# Patient Record
Sex: FEMALE | Race: BLACK OR AFRICAN AMERICAN | NOT HISPANIC OR LATINO | Employment: FULL TIME | URBAN - METROPOLITAN AREA
[De-identification: names, ages, dates, MRNs, and addresses within clinical notes are randomized per-mention and may not be internally consistent; named-entity substitution may affect disease eponyms.]

---

## 2018-03-21 ENCOUNTER — OFFICE VISIT (OUTPATIENT)
Dept: OBSTETRICS AND GYNECOLOGY | Facility: CLINIC | Age: 23
End: 2018-03-21
Payer: MEDICAID

## 2018-03-21 VITALS
DIASTOLIC BLOOD PRESSURE: 84 MMHG | HEIGHT: 64 IN | WEIGHT: 213 LBS | SYSTOLIC BLOOD PRESSURE: 136 MMHG | BODY MASS INDEX: 36.37 KG/M2

## 2018-03-21 DIAGNOSIS — Z12.4 SCREENING FOR CERVICAL CANCER: Primary | ICD-10-CM

## 2018-03-21 DIAGNOSIS — Z30.46 ENCOUNTER FOR SURVEILLANCE OF IMPLANTABLE SUBDERMAL CONTRACEPTIVE: ICD-10-CM

## 2018-03-21 DIAGNOSIS — N89.8 VAGINAL DISCHARGE: ICD-10-CM

## 2018-03-21 DIAGNOSIS — Z01.419 ENCOUNTER FOR GYNECOLOGICAL EXAMINATION (GENERAL) (ROUTINE) WITHOUT ABNORMAL FINDINGS: ICD-10-CM

## 2018-03-21 PROCEDURE — 99213 OFFICE O/P EST LOW 20 MIN: CPT | Mod: PBBFAC | Performed by: OBSTETRICS & GYNECOLOGY

## 2018-03-21 PROCEDURE — 87491 CHLMYD TRACH DNA AMP PROBE: CPT

## 2018-03-21 PROCEDURE — 88175 CYTOPATH C/V AUTO FLUID REDO: CPT

## 2018-03-21 PROCEDURE — 99395 PREV VISIT EST AGE 18-39: CPT | Mod: S$PBB,,, | Performed by: OBSTETRICS & GYNECOLOGY

## 2018-03-21 PROCEDURE — 99999 PR PBB SHADOW E&M-EST. PATIENT-LVL III: CPT | Mod: PBBFAC,,, | Performed by: OBSTETRICS & GYNECOLOGY

## 2018-03-21 PROCEDURE — 87480 CANDIDA DNA DIR PROBE: CPT

## 2018-03-22 ENCOUNTER — PATIENT MESSAGE (OUTPATIENT)
Dept: OBSTETRICS AND GYNECOLOGY | Facility: CLINIC | Age: 23
End: 2018-03-22

## 2018-03-22 LAB
C TRACH DNA SPEC QL NAA+PROBE: NOT DETECTED
CANDIDA RRNA VAG QL PROBE: NEGATIVE
G VAGINALIS RRNA GENITAL QL PROBE: NEGATIVE
N GONORRHOEA DNA SPEC QL NAA+PROBE: NOT DETECTED
T VAGINALIS RRNA GENITAL QL PROBE: NEGATIVE

## 2018-03-22 NOTE — PROGRESS NOTES
Subjective:       Patient ID: Judith Chou is a 23 y.o. female.    Chief Complaint:  Gynecologic Exam      History of Present Illness  HPI  Annual Exam-Premenopausal  Patient presents for annual exam. The patient c/o vaginal discharge and irreg bleeding. The patient is sexually active--nexplanon and condom use. GYN screening history: last pap: approximate date  and was abnormal: lsil. The patient wears seatbelts: yes. The patient participates in regular exercise: yes. Has the patient ever been transfused or tattooed?: no. The patient reports that there is not domestic violence in her life.      Menses irregular since placement of nexplanon 2018    Menses vary from spotting to heavier flow, lasting 3-4 days; min dysmenorrhea      GYN & OB History  Patient's last menstrual period was 2018.   Date of Last Pap: 2016    OB History    Para Term  AB Living   0 0 0 0 0 0   SAB TAB Ectopic Multiple Live Births   0 0 0 0               Review of Systems  Review of Systems   Constitutional: Negative for activity change, appetite change, chills, diaphoresis, fatigue, fever and unexpected weight change.   HENT: Negative for mouth sores and tinnitus.    Eyes: Negative for discharge and visual disturbance.   Respiratory: Negative for cough, shortness of breath and wheezing.    Cardiovascular: Negative for chest pain, palpitations and leg swelling.   Gastrointestinal: Negative for abdominal pain, bloating, blood in stool, constipation, diarrhea, nausea and vomiting.   Endocrine: Negative for diabetes, hair loss, hot flashes, hyperthyroidism and hypothyroidism.   Genitourinary: Positive for menstrual problem and vaginal discharge. Negative for decreased libido, dyspareunia, dysuria, flank pain, frequency, genital sores, hematuria, menorrhagia, pelvic pain, urgency, vaginal bleeding, vaginal pain, dysmenorrhea, urinary incontinence, postcoital bleeding, postmenopausal bleeding and  vaginal odor.   Musculoskeletal: Negative for back pain and myalgias.   Skin:  Negative for rash, no acne and hair changes.   Neurological: Negative for seizures, syncope, numbness and headaches.   Hematological: Negative for adenopathy. Does not bruise/bleed easily.   Psychiatric/Behavioral: Negative for depression and sleep disturbance. The patient is not nervous/anxious.    Breast: Negative for breast mass, breast pain, nipple discharge and skin changes          Objective:    Physical Exam:   Constitutional: She appears well-developed.     Eyes: Conjunctivae and EOM are normal. Pupils are equal, round, and reactive to light.    Neck: Normal range of motion. Neck supple.     Pulmonary/Chest: Effort normal. Right breast exhibits no mass, no nipple discharge, no skin change and no tenderness. Left breast exhibits no mass, no nipple discharge, no skin change and no tenderness. Breasts are symmetrical.        Abdominal: Soft.     Genitourinary: Rectum normal and uterus normal. Pelvic exam was performed with patient supine. Cervix is normal. Right adnexum displays no mass and no tenderness. Left adnexum displays no mass and no tenderness. No erythema, bleeding, rectocele, cystocele or unspecified prolapse of vaginal walls in the vagina. Vaginal discharge found. Labial bartholins normal.Additional cervical findings: pap smear done       Uterus Size: 6 cm   Musculoskeletal: Normal range of motion.       Neurological: She is alert.    Skin: Skin is warm.    Psychiatric: She has a normal mood and affect.          Assessment:        1. Screening for cervical cancer    2. Encounter for gynecological examination (general) (routine) without abnormal findings    3. Encounter for surveillance of implantable subdermal contraceptive    4. Vaginal discharge               Plan:      Continue annual well woman exam.  Pap today. Reviewed updated recommendations for pap smears (every 3 years) in low risk patients.   Recommend annual  pelvic exams.  Reviewed recommendations for annual CBE.  Encouraged diet, exercise, weight loss  Reassurance given re irreg menses with nexplanon; if bleeding worsens consider cycle with ocp or nuva ring  Gc/ct/affirm today to eval vaginal discharge

## 2018-04-04 ENCOUNTER — PATIENT MESSAGE (OUTPATIENT)
Dept: OBSTETRICS AND GYNECOLOGY | Facility: CLINIC | Age: 23
End: 2018-04-04

## 2020-12-15 ENCOUNTER — TELEPHONE (OUTPATIENT)
Dept: OBSTETRICS AND GYNECOLOGY | Facility: CLINIC | Age: 25
End: 2020-12-15

## 2020-12-15 NOTE — TELEPHONE ENCOUNTER
----- Message from Marla Perdomo sent at 12/15/2020 12:29 PM CST -----  Patient called in regards to having some questions for Dr. Yoo in regards to delivering her baby. She asked if someone could give her a call back at [phone].

## 2020-12-18 ENCOUNTER — TELEPHONE (OUTPATIENT)
Dept: OBSTETRICS AND GYNECOLOGY | Facility: CLINIC | Age: 25
End: 2020-12-18

## 2020-12-18 NOTE — TELEPHONE ENCOUNTER
Called patient and scheduled annual.  Visitor's policy and check in procedure explained and patient verbalized understanding.

## 2020-12-18 NOTE — TELEPHONE ENCOUNTER
----- Message from Mirella Garcia sent at 12/18/2020  9:03 AM CST -----  Contact: Judith Wong is calling to schedule an annual visit with Dr. Ann Yoo and I didn't see any appointments come up. May you please give her a call back at [phone] to try and schedule.    Thanks  KT

## 2021-03-17 ENCOUNTER — LAB VISIT (OUTPATIENT)
Dept: LAB | Facility: HOSPITAL | Age: 26
End: 2021-03-17
Attending: NURSE PRACTITIONER

## 2021-03-17 ENCOUNTER — OFFICE VISIT (OUTPATIENT)
Dept: OBSTETRICS AND GYNECOLOGY | Facility: CLINIC | Age: 26
End: 2021-03-17

## 2021-03-17 VITALS
BODY MASS INDEX: 38.24 KG/M2 | SYSTOLIC BLOOD PRESSURE: 124 MMHG | WEIGHT: 224 LBS | DIASTOLIC BLOOD PRESSURE: 82 MMHG | HEIGHT: 64 IN

## 2021-03-17 DIAGNOSIS — Z20.2 ENCOUNTER FOR ASSESSMENT OF STD EXPOSURE: ICD-10-CM

## 2021-03-17 DIAGNOSIS — Z01.419 ROUTINE GYNECOLOGICAL EXAMINATION: Primary | ICD-10-CM

## 2021-03-17 DIAGNOSIS — Z12.4 PAPANICOLAOU SMEAR FOR CERVICAL CANCER SCREENING: ICD-10-CM

## 2021-03-17 DIAGNOSIS — Z97.5 NEXPLANON IN PLACE: ICD-10-CM

## 2021-03-17 PROCEDURE — 99395 PR PREVENTIVE VISIT,EST,18-39: ICD-10-PCS | Mod: S$PBB,,, | Performed by: NURSE PRACTITIONER

## 2021-03-17 PROCEDURE — 99999 PR PBB SHADOW E&M-EST. PATIENT-LVL III: ICD-10-PCS | Mod: PBBFAC,,, | Performed by: NURSE PRACTITIONER

## 2021-03-17 PROCEDURE — 99395 PREV VISIT EST AGE 18-39: CPT | Mod: S$PBB,,, | Performed by: NURSE PRACTITIONER

## 2021-03-17 PROCEDURE — 87591 N.GONORRHOEAE DNA AMP PROB: CPT | Performed by: NURSE PRACTITIONER

## 2021-03-17 PROCEDURE — 88175 CYTOPATH C/V AUTO FLUID REDO: CPT | Performed by: NURSE PRACTITIONER

## 2021-03-17 PROCEDURE — 36415 COLL VENOUS BLD VENIPUNCTURE: CPT | Performed by: NURSE PRACTITIONER

## 2021-03-17 PROCEDURE — 87491 CHLMYD TRACH DNA AMP PROBE: CPT | Performed by: NURSE PRACTITIONER

## 2021-03-17 PROCEDURE — 86592 SYPHILIS TEST NON-TREP QUAL: CPT | Performed by: NURSE PRACTITIONER

## 2021-03-17 PROCEDURE — 99999 PR PBB SHADOW E&M-EST. PATIENT-LVL III: CPT | Mod: PBBFAC,,, | Performed by: NURSE PRACTITIONER

## 2021-03-17 PROCEDURE — 86703 HIV-1/HIV-2 1 RESULT ANTBDY: CPT | Performed by: NURSE PRACTITIONER

## 2021-03-17 RX ORDER — OXYBUTYNIN CHLORIDE 15 MG/1
15 TABLET, EXTENDED RELEASE ORAL
COMMUNITY
Start: 2020-07-16 | End: 2021-11-19

## 2021-03-17 RX ORDER — CETIRIZINE HYDROCHLORIDE 10 MG/1
10 TABLET ORAL
COMMUNITY
Start: 2020-07-16 | End: 2021-11-19

## 2021-03-17 RX ORDER — VIT C/E/ZN/COPPR/LUTEIN/ZEAXAN 250MG-90MG
2000 CAPSULE ORAL
COMMUNITY
Start: 2020-11-10

## 2021-03-18 LAB
C TRACH DNA SPEC QL NAA+PROBE: NOT DETECTED
HIV 1+2 AB+HIV1 P24 AG SERPL QL IA: NEGATIVE
N GONORRHOEA DNA SPEC QL NAA+PROBE: NOT DETECTED
RPR SER QL: NORMAL

## 2021-03-26 LAB
CLINICAL INFO: NORMAL
CYTO CVX: NORMAL
CYTOLOGIST CVX/VAG CYTO: NORMAL
CYTOLOGIST CVX/VAG CYTO: NORMAL
CYTOLOGY CMNT CVX/VAG CYTO-IMP: NORMAL
CYTOLOGY PAP THIN PREP EXPLANATION: NORMAL
DATE OF PREVIOUS PAP: NORMAL
DATE PREVIOUS BX: NO
LMP START DATE: NORMAL
SPECIMEN SOURCE CVX/VAG CYTO: NORMAL
STAT OF ADQ CVX/VAG CYTO-IMP: NORMAL

## 2021-04-29 ENCOUNTER — PATIENT MESSAGE (OUTPATIENT)
Dept: RESEARCH | Facility: HOSPITAL | Age: 26
End: 2021-04-29

## 2021-05-02 ENCOUNTER — PATIENT MESSAGE (OUTPATIENT)
Dept: OBSTETRICS AND GYNECOLOGY | Facility: CLINIC | Age: 26
End: 2021-05-02

## 2021-05-11 ENCOUNTER — TELEPHONE (OUTPATIENT)
Dept: OBSTETRICS AND GYNECOLOGY | Facility: CLINIC | Age: 26
End: 2021-05-11

## 2021-05-12 ENCOUNTER — TELEPHONE (OUTPATIENT)
Dept: OBSTETRICS AND GYNECOLOGY | Facility: CLINIC | Age: 26
End: 2021-05-12

## 2021-05-24 ENCOUNTER — TELEPHONE (OUTPATIENT)
Dept: OBSTETRICS AND GYNECOLOGY | Facility: CLINIC | Age: 26
End: 2021-05-24

## 2021-06-04 ENCOUNTER — PROCEDURE VISIT (OUTPATIENT)
Dept: OBSTETRICS AND GYNECOLOGY | Facility: CLINIC | Age: 26
End: 2021-06-04
Payer: COMMERCIAL

## 2021-06-04 VITALS
WEIGHT: 226 LBS | HEIGHT: 64 IN | SYSTOLIC BLOOD PRESSURE: 120 MMHG | DIASTOLIC BLOOD PRESSURE: 82 MMHG | BODY MASS INDEX: 38.58 KG/M2

## 2021-06-04 DIAGNOSIS — Z30.46 NEXPLANON REMOVAL: Primary | ICD-10-CM

## 2021-06-04 PROCEDURE — 11982 PR REMOVAL DRUG IMPLANT DEVICE: ICD-10-PCS | Mod: S$GLB,,, | Performed by: NURSE PRACTITIONER

## 2021-06-04 PROCEDURE — 11982 REMOVE DRUG IMPLANT DEVICE: CPT | Mod: S$GLB,,, | Performed by: NURSE PRACTITIONER

## 2021-06-21 ENCOUNTER — PATIENT MESSAGE (OUTPATIENT)
Dept: OBSTETRICS AND GYNECOLOGY | Facility: CLINIC | Age: 26
End: 2021-06-21

## 2021-07-08 ENCOUNTER — PATIENT MESSAGE (OUTPATIENT)
Dept: OBSTETRICS AND GYNECOLOGY | Facility: CLINIC | Age: 26
End: 2021-07-08

## 2021-07-18 ENCOUNTER — PATIENT MESSAGE (OUTPATIENT)
Dept: OBSTETRICS AND GYNECOLOGY | Facility: CLINIC | Age: 26
End: 2021-07-18

## 2021-08-25 ENCOUNTER — PATIENT MESSAGE (OUTPATIENT)
Dept: OBSTETRICS AND GYNECOLOGY | Facility: CLINIC | Age: 26
End: 2021-08-25

## 2021-09-25 ENCOUNTER — PATIENT MESSAGE (OUTPATIENT)
Dept: OBSTETRICS AND GYNECOLOGY | Facility: CLINIC | Age: 26
End: 2021-09-25

## 2021-10-06 ENCOUNTER — PATIENT MESSAGE (OUTPATIENT)
Dept: OBSTETRICS AND GYNECOLOGY | Facility: CLINIC | Age: 26
End: 2021-10-06

## 2021-11-02 ENCOUNTER — NURSE TRIAGE (OUTPATIENT)
Dept: ADMINISTRATIVE | Facility: CLINIC | Age: 26
End: 2021-11-02
Payer: COMMERCIAL

## 2021-11-02 ENCOUNTER — OFFICE VISIT (OUTPATIENT)
Dept: OBSTETRICS AND GYNECOLOGY | Facility: CLINIC | Age: 26
End: 2021-11-02
Payer: COMMERCIAL

## 2021-11-02 ENCOUNTER — LAB VISIT (OUTPATIENT)
Dept: LAB | Facility: HOSPITAL | Age: 26
End: 2021-11-02
Attending: MIDWIFE
Payer: COMMERCIAL

## 2021-11-02 ENCOUNTER — PATIENT MESSAGE (OUTPATIENT)
Dept: OBSTETRICS AND GYNECOLOGY | Facility: CLINIC | Age: 26
End: 2021-11-02
Payer: COMMERCIAL

## 2021-11-02 VITALS
SYSTOLIC BLOOD PRESSURE: 122 MMHG | DIASTOLIC BLOOD PRESSURE: 80 MMHG | WEIGHT: 218.69 LBS | HEIGHT: 65 IN | BODY MASS INDEX: 36.44 KG/M2

## 2021-11-02 DIAGNOSIS — E55.9 VITAMIN D DEFICIENCY: ICD-10-CM

## 2021-11-02 DIAGNOSIS — Z32.01 POSITIVE PREGNANCY TEST: ICD-10-CM

## 2021-11-02 DIAGNOSIS — Z32.01 POSITIVE PREGNANCY TEST: Primary | ICD-10-CM

## 2021-11-02 DIAGNOSIS — O20.9 BLEEDING IN EARLY PREGNANCY: ICD-10-CM

## 2021-11-02 LAB — HCG INTACT+B SERPL-ACNC: 8.2 MIU/ML

## 2021-11-02 PROCEDURE — 99999 PR PBB SHADOW E&M-EST. PATIENT-LVL III: CPT | Mod: PBBFAC,,, | Performed by: MIDWIFE

## 2021-11-02 PROCEDURE — 87491 CHLMYD TRACH DNA AMP PROBE: CPT | Performed by: MIDWIFE

## 2021-11-02 PROCEDURE — 99213 OFFICE O/P EST LOW 20 MIN: CPT | Mod: S$GLB,,, | Performed by: MIDWIFE

## 2021-11-02 PROCEDURE — 3079F DIAST BP 80-89 MM HG: CPT | Mod: CPTII,S$GLB,, | Performed by: MIDWIFE

## 2021-11-02 PROCEDURE — 86900 BLOOD TYPING SEROLOGIC ABO: CPT | Performed by: MIDWIFE

## 2021-11-02 PROCEDURE — 3079F PR MOST RECENT DIASTOLIC BLOOD PRESSURE 80-89 MM HG: ICD-10-PCS | Mod: CPTII,S$GLB,, | Performed by: MIDWIFE

## 2021-11-02 PROCEDURE — 36415 COLL VENOUS BLD VENIPUNCTURE: CPT | Performed by: MIDWIFE

## 2021-11-02 PROCEDURE — 1159F MED LIST DOCD IN RCRD: CPT | Mod: CPTII,S$GLB,, | Performed by: MIDWIFE

## 2021-11-02 PROCEDURE — 3074F PR MOST RECENT SYSTOLIC BLOOD PRESSURE < 130 MM HG: ICD-10-PCS | Mod: CPTII,S$GLB,, | Performed by: MIDWIFE

## 2021-11-02 PROCEDURE — 99999 PR PBB SHADOW E&M-EST. PATIENT-LVL III: ICD-10-PCS | Mod: PBBFAC,,, | Performed by: MIDWIFE

## 2021-11-02 PROCEDURE — 84702 CHORIONIC GONADOTROPIN TEST: CPT | Performed by: MIDWIFE

## 2021-11-02 PROCEDURE — 87086 URINE CULTURE/COLONY COUNT: CPT | Performed by: MIDWIFE

## 2021-11-02 PROCEDURE — 3008F PR BODY MASS INDEX (BMI) DOCUMENTED: ICD-10-PCS | Mod: CPTII,S$GLB,, | Performed by: MIDWIFE

## 2021-11-02 PROCEDURE — 3008F BODY MASS INDEX DOCD: CPT | Mod: CPTII,S$GLB,, | Performed by: MIDWIFE

## 2021-11-02 PROCEDURE — 87591 N.GONORRHOEAE DNA AMP PROB: CPT | Performed by: MIDWIFE

## 2021-11-02 PROCEDURE — 3074F SYST BP LT 130 MM HG: CPT | Mod: CPTII,S$GLB,, | Performed by: MIDWIFE

## 2021-11-02 PROCEDURE — 1159F PR MEDICATION LIST DOCUMENTED IN MEDICAL RECORD: ICD-10-PCS | Mod: CPTII,S$GLB,, | Performed by: MIDWIFE

## 2021-11-02 PROCEDURE — 99213 PR OFFICE/OUTPT VISIT, EST, LEVL III, 20-29 MIN: ICD-10-PCS | Mod: S$GLB,,, | Performed by: MIDWIFE

## 2021-11-02 RX ORDER — ONDANSETRON 4 MG/1
4 TABLET, ORALLY DISINTEGRATING ORAL NIGHTLY
COMMUNITY
Start: 2021-10-30 | End: 2021-11-19

## 2021-11-03 ENCOUNTER — HOSPITAL ENCOUNTER (EMERGENCY)
Facility: HOSPITAL | Age: 26
Discharge: HOME | End: 2021-11-03
Attending: EMERGENCY MEDICINE
Payer: COMMERCIAL

## 2021-11-03 VITALS
RESPIRATION RATE: 16 BRPM | DIASTOLIC BLOOD PRESSURE: 88 MMHG | BODY MASS INDEX: 36.56 KG/M2 | TEMPERATURE: 98 F | HEIGHT: 65 IN | WEIGHT: 219.44 LBS | HEART RATE: 90 BPM | SYSTOLIC BLOOD PRESSURE: 133 MMHG | OXYGEN SATURATION: 100 %

## 2021-11-03 DIAGNOSIS — O20.0 THREATENED MISCARRIAGE IN EARLY PREGNANCY: Primary | ICD-10-CM

## 2021-11-03 LAB
ABO + RH BLD: NORMAL
BACTERIA #/AREA URNS HPF: ABNORMAL /HPF
BILIRUB UR QL STRIP: NEGATIVE
BLD GP AB SCN CELLS X3 SERPL QL: NORMAL
CLARITY UR: CLEAR
COLOR UR: YELLOW
GLUCOSE UR QL STRIP: NEGATIVE
HCG INTACT+B SERPL-ACNC: 5.8 MIU/ML
HGB UR QL STRIP: ABNORMAL
KETONES UR QL STRIP: NEGATIVE
LEUKOCYTE ESTERASE UR QL STRIP: NEGATIVE
MICROSCOPIC COMMENT: ABNORMAL
NITRITE UR QL STRIP: POSITIVE
PH UR STRIP: 6 [PH] (ref 5–8)
PROT UR QL STRIP: ABNORMAL
RBC #/AREA URNS HPF: 55 /HPF (ref 0–4)
SP GR UR STRIP: 1.02 (ref 1–1.03)
URN SPEC COLLECT METH UR: ABNORMAL
UROBILINOGEN UR STRIP-ACNC: NEGATIVE EU/DL

## 2021-11-03 PROCEDURE — 81000 URINALYSIS NONAUTO W/SCOPE: CPT | Performed by: EMERGENCY MEDICINE

## 2021-11-03 PROCEDURE — 99283 EMERGENCY DEPT VISIT LOW MDM: CPT

## 2021-11-03 PROCEDURE — 84702 CHORIONIC GONADOTROPIN TEST: CPT | Performed by: EMERGENCY MEDICINE

## 2021-11-04 ENCOUNTER — PATIENT MESSAGE (OUTPATIENT)
Dept: OBSTETRICS AND GYNECOLOGY | Facility: CLINIC | Age: 26
End: 2021-11-04
Payer: COMMERCIAL

## 2021-11-04 ENCOUNTER — DOCUMENTATION ONLY (OUTPATIENT)
Dept: OBSTETRICS AND GYNECOLOGY | Facility: CLINIC | Age: 26
End: 2021-11-04
Payer: COMMERCIAL

## 2021-11-04 LAB
BACTERIA UR CULT: NO GROWTH
C TRACH DNA SPEC QL NAA+PROBE: NOT DETECTED
N GONORRHOEA DNA SPEC QL NAA+PROBE: NOT DETECTED

## 2021-11-06 ENCOUNTER — PATIENT MESSAGE (OUTPATIENT)
Dept: OBSTETRICS AND GYNECOLOGY | Facility: CLINIC | Age: 26
End: 2021-11-06
Payer: COMMERCIAL

## 2021-11-08 ENCOUNTER — TELEPHONE (OUTPATIENT)
Dept: OBSTETRICS AND GYNECOLOGY | Facility: CLINIC | Age: 26
End: 2021-11-08
Payer: COMMERCIAL

## 2021-11-08 DIAGNOSIS — R82.998 DARK URINE: Primary | ICD-10-CM

## 2021-11-09 ENCOUNTER — NURSE TRIAGE (OUTPATIENT)
Dept: ADMINISTRATIVE | Facility: CLINIC | Age: 26
End: 2021-11-09
Payer: COMMERCIAL

## 2021-11-09 ENCOUNTER — LAB VISIT (OUTPATIENT)
Dept: LAB | Facility: HOSPITAL | Age: 26
End: 2021-11-09
Attending: MIDWIFE
Payer: COMMERCIAL

## 2021-11-09 ENCOUNTER — PATIENT MESSAGE (OUTPATIENT)
Dept: OBSTETRICS AND GYNECOLOGY | Facility: CLINIC | Age: 26
End: 2021-11-09
Payer: COMMERCIAL

## 2021-11-09 ENCOUNTER — LAB VISIT (OUTPATIENT)
Dept: LAB | Facility: HOSPITAL | Age: 26
End: 2021-11-09
Payer: COMMERCIAL

## 2021-11-09 DIAGNOSIS — Z32.01 POSITIVE PREGNANCY TEST: ICD-10-CM

## 2021-11-09 DIAGNOSIS — R82.998 DARK URINE: ICD-10-CM

## 2021-11-09 LAB
BACTERIA #/AREA URNS HPF: ABNORMAL /HPF
BILIRUB UR QL STRIP: NEGATIVE
CLARITY UR: ABNORMAL
COLOR UR: YELLOW
GLUCOSE UR QL STRIP: NEGATIVE
HCG INTACT+B SERPL-ACNC: <1.2 MIU/ML
HGB UR QL STRIP: NEGATIVE
KETONES UR QL STRIP: NEGATIVE
LEUKOCYTE ESTERASE UR QL STRIP: ABNORMAL
MICROSCOPIC COMMENT: ABNORMAL
NITRITE UR QL STRIP: NEGATIVE
PH UR STRIP: 7 [PH] (ref 5–8)
PROT UR QL STRIP: NEGATIVE
RBC #/AREA URNS HPF: 2 /HPF (ref 0–4)
SP GR UR STRIP: 1.02 (ref 1–1.03)
SQUAMOUS #/AREA URNS HPF: 3 /HPF
URN SPEC COLLECT METH UR: ABNORMAL
WBC #/AREA URNS HPF: 25 /HPF (ref 0–5)

## 2021-11-09 PROCEDURE — 84702 CHORIONIC GONADOTROPIN TEST: CPT | Performed by: MIDWIFE

## 2021-11-09 PROCEDURE — 36415 COLL VENOUS BLD VENIPUNCTURE: CPT | Performed by: MIDWIFE

## 2021-11-09 PROCEDURE — 81000 URINALYSIS NONAUTO W/SCOPE: CPT | Performed by: MIDWIFE

## 2021-11-09 PROCEDURE — 87086 URINE CULTURE/COLONY COUNT: CPT | Performed by: MIDWIFE

## 2021-11-10 ENCOUNTER — DOCUMENTATION ONLY (OUTPATIENT)
Dept: OBSTETRICS AND GYNECOLOGY | Facility: CLINIC | Age: 26
End: 2021-11-10
Payer: COMMERCIAL

## 2021-11-10 ENCOUNTER — PATIENT MESSAGE (OUTPATIENT)
Dept: OBSTETRICS AND GYNECOLOGY | Facility: CLINIC | Age: 26
End: 2021-11-10
Payer: COMMERCIAL

## 2021-11-10 PROBLEM — O03.9 SAB (SPONTANEOUS ABORTION): Status: ACTIVE | Noted: 2021-11-02

## 2021-11-10 LAB
BACTERIA UR CULT: NORMAL
BACTERIA UR CULT: NORMAL

## 2021-11-11 ENCOUNTER — PATIENT MESSAGE (OUTPATIENT)
Dept: OBSTETRICS AND GYNECOLOGY | Facility: CLINIC | Age: 26
End: 2021-11-11
Payer: COMMERCIAL

## 2021-11-17 ENCOUNTER — PATIENT MESSAGE (OUTPATIENT)
Dept: OBSTETRICS AND GYNECOLOGY | Facility: CLINIC | Age: 26
End: 2021-11-17
Payer: COMMERCIAL

## 2021-11-17 ENCOUNTER — PATIENT MESSAGE (OUTPATIENT)
Dept: OBSTETRICS AND GYNECOLOGY | Facility: CLINIC | Age: 26
End: 2021-11-17

## 2021-11-18 ENCOUNTER — PATIENT MESSAGE (OUTPATIENT)
Dept: OBSTETRICS AND GYNECOLOGY | Facility: CLINIC | Age: 26
End: 2021-11-18
Payer: COMMERCIAL

## 2021-11-19 ENCOUNTER — OFFICE VISIT (OUTPATIENT)
Dept: OBSTETRICS AND GYNECOLOGY | Facility: CLINIC | Age: 26
End: 2021-11-19
Payer: COMMERCIAL

## 2021-11-19 VITALS
SYSTOLIC BLOOD PRESSURE: 126 MMHG | BODY MASS INDEX: 36.62 KG/M2 | WEIGHT: 219.81 LBS | HEIGHT: 65 IN | DIASTOLIC BLOOD PRESSURE: 64 MMHG

## 2021-11-19 DIAGNOSIS — O03.9 SPONTANEOUS ABORTION: Primary | ICD-10-CM

## 2021-11-19 PROBLEM — K59.01 SLOW TRANSIT CONSTIPATION: Status: ACTIVE | Noted: 2019-02-04

## 2021-11-19 PROBLEM — E66.9 OBESITY (BMI 35.0-39.9 WITHOUT COMORBIDITY): Status: ACTIVE | Noted: 2020-07-16

## 2021-11-19 PROBLEM — E78.00 ELEVATED LDL CHOLESTEROL LEVEL: Status: ACTIVE | Noted: 2020-07-16

## 2021-11-19 PROBLEM — K21.9 GASTROESOPHAGEAL REFLUX DISEASE: Status: ACTIVE | Noted: 2020-07-16

## 2021-11-19 PROBLEM — R56.9 SEIZURE: Status: ACTIVE | Noted: 2021-10-19

## 2021-11-19 PROBLEM — R00.0 TACHYCARDIA: Status: ACTIVE | Noted: 2018-12-14

## 2021-11-19 PROBLEM — E55.9 VITAMIN D DEFICIENCY: Status: ACTIVE | Noted: 2020-07-16

## 2021-11-19 PROBLEM — E05.90 SUBCLINICAL HYPERTHYROIDISM: Status: ACTIVE | Noted: 2020-07-16

## 2021-11-19 PROCEDURE — 99213 OFFICE O/P EST LOW 20 MIN: CPT | Mod: S$GLB,,, | Performed by: NURSE PRACTITIONER

## 2021-11-19 PROCEDURE — 99999 PR PBB SHADOW E&M-EST. PATIENT-LVL III: ICD-10-PCS | Mod: PBBFAC,,, | Performed by: NURSE PRACTITIONER

## 2021-11-19 PROCEDURE — 99999 PR PBB SHADOW E&M-EST. PATIENT-LVL III: CPT | Mod: PBBFAC,,, | Performed by: NURSE PRACTITIONER

## 2021-11-19 PROCEDURE — 99213 PR OFFICE/OUTPT VISIT, EST, LEVL III, 20-29 MIN: ICD-10-PCS | Mod: S$GLB,,, | Performed by: NURSE PRACTITIONER

## 2021-11-19 RX ORDER — BUSPIRONE HYDROCHLORIDE 5 MG/1
5 TABLET ORAL 3 TIMES DAILY
COMMUNITY
Start: 2021-07-15 | End: 2024-02-08

## 2021-11-25 ENCOUNTER — PATIENT MESSAGE (OUTPATIENT)
Dept: OBSTETRICS AND GYNECOLOGY | Facility: CLINIC | Age: 26
End: 2021-11-25
Payer: COMMERCIAL

## 2021-11-27 ENCOUNTER — PATIENT MESSAGE (OUTPATIENT)
Dept: OBSTETRICS AND GYNECOLOGY | Facility: CLINIC | Age: 26
End: 2021-11-27
Payer: COMMERCIAL

## 2021-12-09 ENCOUNTER — PATIENT MESSAGE (OUTPATIENT)
Dept: OBSTETRICS AND GYNECOLOGY | Facility: CLINIC | Age: 26
End: 2021-12-09
Payer: COMMERCIAL

## 2023-07-11 ENCOUNTER — PATIENT MESSAGE (OUTPATIENT)
Dept: RESEARCH | Facility: HOSPITAL | Age: 28
End: 2023-07-11
Payer: COMMERCIAL

## 2024-04-22 ENCOUNTER — TELEPHONE (OUTPATIENT)
Dept: NEUROLOGY | Facility: CLINIC | Age: 29
End: 2024-04-22
Payer: COMMERCIAL

## 2024-04-22 NOTE — TELEPHONE ENCOUNTER
----- Message from Jennifer Buenrostro MA sent at 4/22/2024  3:10 PM CDT -----  Regarding: NP needing a neurologist  Good morning,     [place] and needing to get in with a neurologist at the [facility]. If you guys can reach out to her ASAP to get her scheduled that would help tremendously.!      /thanks,  Jennifer Buenrostro MA.

## 2024-04-22 NOTE — TELEPHONE ENCOUNTER
Called patient in regards to message. She stated that her neurologist with [facility] referred to [facility] for a 3-5 EEG. I told her that I did not see a referral for it. Patient is going to call her [facility] neuro and see about the referral. Sangita found the order in her a chart and it is an in hospital EEG that takes 3-5 days. The patient is going to call back.

## 2024-04-22 NOTE — TELEPHONE ENCOUNTER
Called pt to schedule.  PT stated she would rather be seen at the [facility]. I forwarded her info over to their staff and asked them to get her scheduled.

## 2024-04-23 ENCOUNTER — TELEPHONE (OUTPATIENT)
Dept: NEUROLOGY | Facility: CLINIC | Age: 29
End: 2024-04-23
Payer: COMMERCIAL

## 2024-04-23 ENCOUNTER — PATIENT MESSAGE (OUTPATIENT)
Dept: NEUROLOGY | Facility: CLINIC | Age: 29
End: 2024-04-23
Payer: COMMERCIAL

## 2024-04-23 NOTE — TELEPHONE ENCOUNTER
Spoke with patient about being evaluated for EMU[place] one of our [place] [facility] [place] providers are the EMU MDs and she is 19 weeks pregnant. This is a safety concern and needs to be seen here, virtually or in person. V/U.

## 2024-04-23 NOTE — TELEPHONE ENCOUNTER
Called the pt to schedule an appointment with Dr. Calix.waiting for a response from Dr. Calix but pt did take 5/1 at 1 pm in hopes that Dr. Calix can fit her in a little bit sooner being the situation is pretty urgent.

## 2024-05-01 ENCOUNTER — TELEPHONE (OUTPATIENT)
Dept: NEUROLOGY | Facility: CLINIC | Age: 29
End: 2024-05-01

## 2024-05-01 ENCOUNTER — OFFICE VISIT (OUTPATIENT)
Dept: NEUROLOGY | Facility: CLINIC | Age: 29
End: 2024-05-01
Payer: COMMERCIAL

## 2024-05-01 DIAGNOSIS — Z3A.20 20 WEEKS GESTATION OF PREGNANCY: ICD-10-CM

## 2024-05-01 DIAGNOSIS — G25.3 MYOCLONIC JERKING WHILE SLEEPING: Primary | ICD-10-CM

## 2024-05-01 PROCEDURE — 99204 OFFICE O/P NEW MOD 45 MIN: CPT | Mod: 95,,, | Performed by: PSYCHIATRY & NEUROLOGY

## 2024-05-01 PROCEDURE — 1160F RVW MEDS BY RX/DR IN RCRD: CPT | Mod: CPTII,95,, | Performed by: PSYCHIATRY & NEUROLOGY

## 2024-05-01 PROCEDURE — 1159F MED LIST DOCD IN RCRD: CPT | Mod: CPTII,95,, | Performed by: PSYCHIATRY & NEUROLOGY

## 2024-05-01 RX ORDER — NAPROXEN SODIUM 220 MG/1
81 TABLET, FILM COATED ORAL DAILY
COMMUNITY
Start: 2024-04-05

## 2024-05-01 NOTE — TELEPHONE ENCOUNTER
Gave the patient a call to set up a four week follow up. Pt agreed to a virtual follow up on 5/29 at 1 pm.

## 2024-05-01 NOTE — PATIENT INSTRUCTIONS
You came to Epilepsy Clinic because of myoclonic jerking during sleep. You had seizures as a child so you are at increased risk for having seizures as an adult.     I would like you to get an outpatient EEG to look at the electrical firing of your brain.  Someone will call you to schedule this. Please sleep half your normal amount before the test so that you will fall asleep during the procedure which will provide us more information about how your brain works.      Depending on the results of the routine EEG, I would like to schedule you for an inpatient epilepsy monitoring unit (EMU) admission.  During this prolonged inpatient admission, you will be monitored continuously using scalp EEGs for as long as 5-7 days.  During this admission, we may perform different maneuvers to try to induce seizures.  There are risks associated with inducing seizures, including injury and even death.  However, these risks persist in the hospital or at home with every seizure and we take many steps to protect you including a padded bed with rails, continuous video monitoring, a call button for immediate nursing assistance, continuous cardiac monitoring, and an inpatient medical team to closely follow your progress. You are currently pregnant so monitoring should be done while inpatient.  I feel this admission is necessary in order to better understand your myoclonic jerking during sleep so we can best treat it.  Our epilepsy nurse will call you to schedule this appointment.      Per [place] law, no episodes of loss of consciousness for 6 months before driving.  Avoid dangerous situations.  For example, no baths/pools alone, no heights, no power tools.  Wear a bike helmet.  If breakthrough seizures occur that are different in character, frequency, or duration from normal episodes, please patient portal me or call the office and we will decide the next steps. If multiple seizures occur in a row without return back to baseline, 911  needs to be called.     Return to clinic after the EMU.  Please patient portal with any questions or concerns.    Marlene Calix MD PhD [facility]  Neurology-Epilepsy  [facility].    Forms/Letters/Disability/DMV Paperwork: We understand the importance of filling out forms and providing letters in a timely manner.  However, many of these forms have very tricky language and once an official form is submitted as part of the medical record, it can not be modified or erased.  Please work with us in order to get these forms filled out in the most complete, accurate, and efficient way. 1.  Once you are aware that a form will need to be completed, please make an appointment.  A virtual appointment with Dr. Calix or Eloise is perfectly fine. 2.  Please fill out the form as much as you can.  There are many questions that we do not have an answer for.  Please bring a blank copy of the form and your partially filled out form to the clinic visit or send them to us over the portal.  We will complete the form together with you during the clinic visit, sign it, and either return it to you or send it to the correct destination.  Every form will require an appointment however we can fill out multiple forms at once if needed.  Please do not hesitate to reach out with any questions or concerns about this policy.  We are trying to make sure that we have a system in place to meet this need which works for everyone involved.  Thank you for your understanding.

## 2024-05-01 NOTE — PROGRESS NOTES
Name: Judith Chou  MRN:[id number]   CSN: [id number]  Date of service: 2024  Age:29 y.o.   Gender:female   Referring Physician/Service: Self, Aaareferral  No address on file   The patient is evaluated today with: self    Neurology Virtual Clinic: Initial Visit    CHIEF COMPLAINT:  Myoclonic jerking during sleep    HPI 2024:     Age of first seizure: 9yo  Handedness: left  Seizure Risk Factors:  Paternal uncle with epilepsy (staring, additional details unclear). No head strike with LOC. No CNS infections. C section for reasons that are unclear (possibly a repeat, older brother was also born via ), term, no prolonged hospitalization   Time of Last Seizure: 2024, 2021   # of lifetime Seizures:  2 events that she is aware of as an adult. As a kid, less than five total events  Frequency of Seizures: at most 2 in one day,  by years    Seizure Triggers: out of the blue  Injuries/Hospitalization for seizures? No injury, no ED  Driving? Yes   Pregnancy? 20w pregnant currently, miscarriage 2021 at 4w  Contraception? No, currently pregnant   Folic Acid?  In the prenatal  Bone Health:  No dexa   Mood: up and down, agitated, no SI, no HI  Sleep: bed 1030p (as early as 9pm), wake 6am, sleeps well overnight, tries to take naps   Exercise: [place], likes it. Walks every day.   Tobacco/etoh, etc:  no cigs, no etoh, no drugs      Auras: out of the blue     Seizure Events:   As an adult:  Nocturnal jerking arms, legs, shoulders, out of sleep, wetting the bed, jerking 3 minutes, she will wake up during the jerking and be aware the whole time  Staring and jerking episodes as a kid, with urinary incontinence      Current AED/Neuroleptics/SEs:  None    Previous AED/SEs or reason for DC.   On topiramate between 13yo-15yo,  restarted topiramate for headaches which she stopped in  because her headaches were better     EEG:  Routine EEG 2021 normal  Routine EEG younger -> found  something but not sure what ([facility] -> I was unable to find any record of this)  Ambulatory EEG 68.5 hours 11/2021 normal  MRI brain: 2022 normal     Other Allergies: none      AED compliance, adherence: no missed doses, follows a routine    ROS 5/1/2024: ligament pain in her legs, constipation. Otherwise, denies headache, loss of vision, blurred vision, diplopia, dysarthria, dysphagia, lightheadedness, vertigo, tinnitus or hearing difficulty. Denies difficulties producing or comprehending speech.  Denies focal weakness, numbness, paresthesias. Denies difficulty with gait. Denies cough, shortness of breath.  Denies chest pain or tightness, palpitations.  Denies nausea, vomiting, diarrhea, or abdominal pain.  No bowel or bladder incontinence or retention.  No falls.       EXAM:   - Vitals: There were no vitals taken for this visit.   - General: Awake, cooperative, NAD.  - HEENT: NC/AT  - Neck:  Decreased range of motion  - Pulmonary: no increased WOB  - Cardiac: well perfused   - Abdomen:  BMI  - Extremities:  Not examined  - Skin: no rashes or lesions noted.     NEURO EXAM:   - Mental Status: Awake, alert, oriented x 3. Able to relate history without difficulty. Language is fluent with intact repetition and comprehension. Normal prosody. There were no paraphasic errors. Able to name both high and low frequency objects. Speech was not dysarthric. Able to follow both midline and appendicular commands. There was no evidence of apraxia or neglect.    - Cranial Nerves:  Pupils not evaluated. VFF to confrontation. EOMI. No facial droop. Hearing intact to room voice. Trapezii and SCM with free range of motion. Tongue protrudes in midline and to either side with no evidence of atrophy or weakness.    - Motor: No pronator drift bilaterally. No adventitious movements such as tremor or asterixis noted.  Free range of motion throughout with no obvious asymmetry.     - Sensory:  No subjective deficits  - DTRs: Unable to  evaluate  - Coordination:  No obvious ataxia  - Gait:  Able to stand and ambulate around the room    PLAN:  29-year-old woman with a paternal uncle with staring episodes and myoclonic jerking during sleep who is 20 weeks pregnant.  She does not meet criteria for the diagnosis of epilepsy or for treatment with antiseizure medications at this time.  All of her events are nocturnal so she may drive at this time.  Routine EEG.  Then EMU. Follow up in about 4 weeks (around 5/29/2024).     Patient Instructions   You came to Epilepsy Clinic because of myoclonic jerking during sleep. You had seizures as a child so you are at increased risk for having seizures as an adult.     I would like you to get an outpatient EEG to look at the electrical firing of your brain.  Someone will call you to schedule this. Please sleep half your normal amount before the test so that you will fall asleep during the procedure which will provide us more information about how your brain works.      Depending on the results of the routine EEG, I would like to schedule you for an inpatient epilepsy monitoring unit (EMU) admission.  During this prolonged inpatient admission, you will be monitored continuously using scalp EEGs for as long as 5-7 days.  During this admission, we may perform different maneuvers to try to induce seizures.  There are risks associated with inducing seizures, including injury and even death.  However, these risks persist in the hospital or at home with every seizure and we take many steps to protect you including a padded bed with rails, continuous video monitoring, a call button for immediate nursing assistance, continuous cardiac monitoring, and an inpatient medical team to closely follow your progress. You are currently pregnant so monitoring should be done while inpatient.  I feel this admission is necessary in order to better understand your myoclonic jerking during sleep so we can best treat it.  Our epilepsy nurse  will call you to schedule this appointment.      Per [place] law, no episodes of loss of consciousness for 6 months before driving.  Avoid dangerous situations.  For example, no baths/pools alone, no heights, no power tools.  Wear a bike helmet.  If breakthrough seizures occur that are different in character, frequency, or duration from normal episodes, please patient portal me or call the office and we will decide the next steps. If multiple seizures occur in a row without return back to baseline, 911 needs to be called.     Return to clinic after the EMU.  Please patient portal with any questions or concerns.    Marlene Calix MD PhD [facility]  Neurology-Epilepsy  [facility].    Forms/Letters/Disability/DMV Paperwork: We understand the importance of filling out forms and providing letters in a timely manner.  However, many of these forms have very tricky language and once an official form is submitted as part of the medical record, it can not be modified or erased.  Please work with us in order to get these forms filled out in the most complete, accurate, and efficient way. 1.  Once you are aware that a form will need to be completed, please make an appointment.  A virtual appointment with Dr. Calix or Eloise is perfectly fine. 2.  Please fill out the form as much as you can.  There are many questions that we do not have an answer for.  Please bring a blank copy of the form and your partially filled out form to the clinic visit or send them to us over the portal.  We will complete the form together with you during the clinic visit, sign it, and either return it to you or send it to the correct destination.  Every form will require an appointment however we can fill out multiple forms at once if needed.  Please do not hesitate to reach out with any questions or concerns about this policy.  We are trying to make sure that we have a system in place to meet this need which works for everyone involved.   Thank you for your understanding.            Problem List Items Addressed This Visit       Myoclonic jerking while sleeping - Primary    Relevant Orders    EEG,w/awake & asleep record    EMU Monitoring    20 weeks gestation of pregnancy    Relevant Orders    EEG,w/awake & asleep record    EMU Monitoring       The patient location is:  Patient Home   The chief complaint leading to consultation is:  Myoclonic jerking in the setting of pregnancy  Visit type: Virtual visit with synchronous audio and video  Total time spent with patient:  40 minutes  Total time spent on encounter:  45 minutes   Each patient to whom Marlene Calix provides medical services by telemedicine is:  (1) informed of the relationship between the physician and patient and the respective role of any other health care provider with respect to management of the patient; and (2) notified that he or she may decline to receive medical services by telemedicine and may withdraw from such care at any time.    Disclaimer: This note has been generated using voice-recognition software. There may be typographical errors that were missed during proof-reading.     LABS:  Recent Labs   Lab 07/15/21  1512 22  1016 22  0907 23  [id number]   White Blood Cell Count 9.3  --   --   --    Hemoglobin 13.2  --   --   --    Hematocrit 40.8  --   --   --    Platelets 284  --   --   --    Hemoglobin A1C 5.6  --   --   --    TSH 0.674  --  0.900 1.160   RPR  --  NonReactive  --   --      IMAGING:  Recent imaging is personally reviewed with the patient.    None in the system     PAST MEDICAL HISTORY:   Active Ambulatory Problems     Diagnosis Date Noted    Vitamin D deficiency 2020    SAB (spontaneous ) 2021    Elevated LDL cholesterol level 2020    Gastroesophageal reflux disease 2020    Mild cognitive impairment 2013    Obesity (BMI 35.0-39.9 without comorbidity) 2020    Seizure 10/19/2021    Slow transit constipation  02/04/2019    Subclinical hyperthyroidism 07/16/2020    Tachycardia 12/14/2018    Myoclonic jerking while sleeping 05/01/2024    20 weeks gestation of pregnancy 05/01/2024     Resolved Ambulatory Problems     Diagnosis Date Noted    No Resolved Ambulatory Problems     Past Medical History:   Diagnosis Date    Anxiety     Chlamydia 2018    Epilepsy         PAST SURGICAL HISTORY:   Past Surgical History:   Procedure Laterality Date    BLADDER SURGERY      WISDOM TOOTH EXTRACTION          ALLERGIES: Patient has no known allergies.   CURRENT MEDICATIONS:   Current Outpatient Medications   Medication Sig Dispense Refill    aspirin 81 MG Chew Take 81 mg by mouth once daily.      cholecalciferol, vitamin D3, (VITAMIN D3) 25 mcg (1,000 unit) capsule Take 2,000 Units by mouth.      prenatal vit,cori 74-iron-folic 27 mg iron- 1 mg Tab        No current facility-administered medications for this visit.        FAMILY HISTORY:   Family History   Problem Relation Name Age of Onset    Diabetes Father      Hypertension Father      Diabetes Mother      Breast cancer Neg Hx      Colon cancer Neg Hx      Ovarian cancer Neg Hx           SOCIAL HISTORY:   Social History     Socioeconomic History    Marital status: Single   Tobacco Use    Smoking status: Never    Smokeless tobacco: Never   Substance and Sexual Activity    Alcohol use: No    Drug use: No    Sexual activity: Yes     Partners: Male     Birth control/protection: None     Social Determinants of Health     Financial Resource Strain: Low Risk  (7/14/2022)    Received from [facility] Its Subsidiaries and Affiliates, [facility] and Its Subsidiaries and Affiliates    Overall Financial Resource Strain (CARDIA)     Difficulty of Paying Living Expenses: Not very hard   Food Insecurity: No Food Insecurity (8/3/2023)    [facility]  and [facility], [facility]    Hunger Vital Sign     Worried About Running Out of Food in the Last Year: Never true     Ran Out of Food in the Last Year: Never true   Transportation Needs: No Transportation Needs (8/3/2023)    [facility], [facility] and Its [facility] and Affiliates    PRAPARE - Transportation     Lack of Transportation (Medical): No     Lack of Transportation (Non-Medical): No   Physical Activity: Sufficiently Active (8/3/2023)    Received from [facility] and Its [facility] and Affiliates, [facility] and Its [facility] and [facility]    Exercise Vital Sign     Days of Exercise per Week: 3 days     Minutes of Exercise per Session: 60 min   Stress: No Stress Concern Present (8/3/2023)    Received from [facility], [facility] and [facility] of Occupational Health - Occupational Stress Questionnaire     Feeling of Stress : Not at all   Housing Stability: Unknown (8/3/2023)    Received from [facility] and Its [facility] and Affiliates, [facility] Its [facility] and [facility]    Housing Stability Vital Sign     Unable to Pay for Housing in the Last Year: No     In the last 12 months, was there a time when you did not have a steady place to sleep or slept in a shelter (including now)?: No         Questions and concerns raised by the patient and family/care-giver(s) were addressed and they indicated understanding of everything discussed and agreed to plans as above.    Marlene Calix MD PhD  [facility]  Neurology-Epilepsy  [facility].

## 2024-05-02 ENCOUNTER — TELEPHONE (OUTPATIENT)
Dept: NEUROLOGY | Facility: CLINIC | Age: 29
End: 2024-05-02
Payer: COMMERCIAL

## 2024-05-02 NOTE — TELEPHONE ENCOUNTER
"Spoke with patient about EEG and EMU. Currently has EEG Scheduled 5/23/24 and she says Dr. Calix wants this done first before admitting her to the EMU since she is pregnant. I have faxed clinic notes with plan of care to Kajal Young NP, with [facility], [phone].     Your fax has been successfully sent to [phone] at [phone].  ------------------------------------------------------------  From: [id number]  ------------------------------------------------------------  5/2/2024 10:02:12 AM Transmission Record   Sent to [phone] with remote ID "[id number]          "   Result: (0/339;0/0) Success   Page record: 1 - 11   Elapsed time: 04:44 on channel 29    "

## 2024-05-23 ENCOUNTER — HOSPITAL ENCOUNTER (OUTPATIENT)
Dept: PULMONOLOGY | Facility: HOSPITAL | Age: 29
Discharge: HOME | End: 2024-05-23
Attending: NURSE PRACTITIONER
Payer: COMMERCIAL

## 2024-05-23 DIAGNOSIS — G25.3 MYOCLONIC JERKING WHILE SLEEPING: ICD-10-CM

## 2024-05-23 DIAGNOSIS — Z3A.20 20 WEEKS GESTATION OF PREGNANCY: ICD-10-CM

## 2024-05-23 PROCEDURE — 95819 EEG AWAKE AND ASLEEP: CPT

## 2024-05-23 PROCEDURE — 95819 EEG AWAKE AND ASLEEP: CPT | Mod: 26,,, | Performed by: PSYCHIATRY & NEUROLOGY

## 2024-05-27 NOTE — PROGRESS NOTES
DATE: 5/23/24    EEG NUMBER:  [id number]    REFERRING PHYSICIAN:  Dr. Calix      This EEG was performed to assess for evidence of underlying epilepsy.     ELECTROENCEPHALOGRAM REPORT     METHODOLOGY:  Electroencephalographic (EEG) recording is with electrodes placed according to the International 10-20 placement system.  Thirty two (32) channels of digital signal are simultaneously recorded from the scalp and may include EKG, EMG, and/or eye monitors.   Recording band pass was 0.1 to 512 hz.  Digital video recording of the patient is simultaneously recorded with the EEG.  The staff report clinical symptoms and may press an event button when the patient has symptoms of clinical interest to the treating physicians.  EEG and video recording is stored and archived in digital format.  The entire recording is visually reviewed, and the times identified by computer analysis as being spikes or seizures are reviewed again.  Activation procedures which include photic stimulation, hyperventilation and instructing patients to perform simple task are done in selected patients.   Compresses spectral analysis (CSA) is also performed on the activity recorded from each individual channel.  This is displayed as a power display of frequencies from 0 to 30 Hz over time.   The CSA analysis is done and displayed continuously.  This is reviewed for asymmetries in power between homologous areas of the scalp and for presence of changes in power which can be seen when seizures occur.  Sections of suspected abnormalities on the CSA is then compared with the original EEG recording.                Scayl software was also utilized in the review of this study.  This software suite analyzes the EEG recording in multiple domains.  Coherence and rhythmicity is computed to identify EEG sections which may contain organized seizures.  Each channel undergoes analysis to detect presence of spike and sharp waves which have special and morphological  characteristic of epileptic activity.  The routine EEG recording is converted from spacial into frequency domain.  This is then displayed comparing homologous areas to identify areas of significant asymmetry.  Algorithm to identify non-cortically generated artifact is used to separate eye movement, EMG and other artifact from the EEG.     EEG FINDINGS:  The recording was obtained with a number of standard bipolar and referential montages during wakefulness, drowsiness and sleep.  In the alert state, the posterior background rhythm was a symmetric, well-modulated 9 to 10 Hz alpha rhythm, which reacted symmetrically to eye opening.  Intermittent photic stimulation evoked symmetric posterior driving responses. Hyperventilation produced physiological slowing.  No abnormalities were activated by photic stimulation or hyperventilation.  During drowsiness, the background rhythm waxed and waned and there were periods of slowing.  During stage II sleep, symmetric V waves and sleep spindles were noted.  There were no focal abnormalities.  There were no interictal epileptiform abnormalities and no clinical or electrographic seizures were recorded.    The EKG channel revealed a sinus rhythm.     IMPRESSION:  This is a normal EEG during wakefulness, drowsiness and sleep.     CLINICAL CORRELATION:  The patient is a  29-year-old female who is being evaluated for episodes of myoclonus.  The patient is currently not maintained on any anti-seizure medications.  This is a normal EEG during wakefulness, drowsiness and sleep.  There is no evidence for neither cortical dysfunction nor an epileptic process on this recording.  No seizures were recorded during this study.

## 2024-05-28 ENCOUNTER — TELEPHONE (OUTPATIENT)
Dept: NEUROLOGY | Facility: CLINIC | Age: 29
End: 2024-05-28
Payer: COMMERCIAL

## 2024-05-28 NOTE — TELEPHONE ENCOUNTER
Spoke with patient about scheduling EMU. She is not sure this is something she wishes to move forward with especially since she's had only 2 events ever in her sleep she was not aware of and feels she'd likely come in and have no events since we would not be provoking activity since she is pregnant. She wants to think on it.

## 2024-05-28 NOTE — TELEPHONE ENCOUNTER
"I have faxed the EEG results, 5/23/24, and a note explaining this patient is not moving forward at this time, but can call me directly if she changes her mind, to [facility], Attn: Kajal Young NP, [phone].    Your fax has been successfully sent to [phone] at [phone].  ------------------------------------------------------------  From: [id number]  ------------------------------------------------------------  5/28/2024 12:53:38 PM Transmission Record   Sent to [phone] with remote ID "[id number]          "   Result: (0/339;0/0) Success   Page record: 1 - 3   Elapsed time: 01:58 on channel 4      "

## 2024-05-29 ENCOUNTER — OFFICE VISIT (OUTPATIENT)
Dept: NEUROLOGY | Facility: CLINIC | Age: 29
End: 2024-05-29
Payer: COMMERCIAL

## 2024-05-29 DIAGNOSIS — G25.3 MYOCLONIC JERKING WHILE SLEEPING: Primary | ICD-10-CM

## 2024-05-29 DIAGNOSIS — E66.9 OBESITY (BMI 35.0-39.9 WITHOUT COMORBIDITY): ICD-10-CM

## 2024-05-29 DIAGNOSIS — Z3A.24 24 WEEKS GESTATION OF PREGNANCY: ICD-10-CM

## 2024-05-29 PROCEDURE — 99213 OFFICE O/P EST LOW 20 MIN: CPT | Mod: 95,,, | Performed by: PSYCHIATRY & NEUROLOGY

## 2024-05-29 PROCEDURE — 1159F MED LIST DOCD IN RCRD: CPT | Mod: CPTII,95,, | Performed by: PSYCHIATRY & NEUROLOGY

## 2024-05-29 PROCEDURE — 1160F RVW MEDS BY RX/DR IN RCRD: CPT | Mod: CPTII,95,, | Performed by: PSYCHIATRY & NEUROLOGY

## 2024-05-29 NOTE — PATIENT INSTRUCTIONS
You came to Epilepsy Clinic because of myoclonic jerking during sleep. You had seizures as a child so you are at increased risk for having seizures as an adult.  You are routine EEG was normal and you have not had an additional episode of myoclonic jerking since 01/2024.  It is okay to hold off on any further procedures until/unless the event reoccurs.     If another episode occurs, please let me know over the portal.  At that time, you can elect to come to the emergency room at [facility] on [facility] and request a 24 hour EEG to be placed urgently.  Alternatively, we may decide to schedule you for an inpatient epilepsy monitoring unit (EMU) admission at that time.  During this prolonged inpatient admission, you will be monitored continuously using scalp EEGs for as long as 5-7 days.  During this admission, we may perform different maneuvers to try to induce seizures.  There are risks associated with inducing seizures, including injury and even death.  However, these risks persist in the hospital or at home with every seizure and we take many steps to protect you including a padded bed with rails, continuous video monitoring, a call button for immediate nursing assistance, continuous cardiac monitoring, and an inpatient medical team to closely follow your progress.      Per [place] law, no episodes of loss of consciousness for 6 months before driving.  Avoid dangerous situations.  For example, no baths/pools alone, no heights, no power tools.  Wear a bike helmet.  If breakthrough seizures occur that are different in character, frequency, or duration from normal episodes, please patient portal me or call the office and we will decide the next steps. If multiple seizures occur in a row without return back to baseline, 911 needs to be called.      Return to clinic with any additional episodes of shaking.  Please patient portal with any questions or concerns.     Marlene Calix MD PhD [facility]  Neurology-Epilepsy      Forms/Letters/Disability/DMV Paperwork: We understand the importance of filling out forms and providing letters in a timely manner.  However, many of these forms have very tricky language and once an official form is submitted as part of the medical record, it can not be modified or erased.  Please work with us in order to get these forms filled out in the most complete, accurate, and efficient way. 1.  Once you are aware that a form will need to be completed, please make an appointment.  A virtual appointment with Dr. Calix or Eloise is perfectly fine. 2.  Please fill out the form as much as you can.  There are many questions that we do not have an answer for.  Please bring a blank copy of the form and your partially filled out form to the clinic visit or send them to us over the portal.  We will complete the form together with you during the clinic visit, sign it, and either return it to you or send it to the correct destination.  Every form will require an appointment however we can fill out multiple forms at once if needed.  Please do not hesitate to reach out with any questions or concerns about this policy.  We are trying to make sure that we have a system in place to meet this need which works for everyone involved.  Thank you for your understanding.

## 2024-05-29 NOTE — PROGRESS NOTES
Name: Judith Chou  MRN:[id number]   CSN: [id number]  Date of service: 2024  Age:29 y.o.   Gender:female   Referring Physician/Service: No referring provider defined for this encounter.   The patient is evaluated today with: self    Neurology Virtual Clinic:  Follow-up Visit    CHIEF COMPLAINT:  Myoclonic jerking during sleep    Interval Events/ROS 2024:    Current AED/SEs:  None  Breakthrough seizures/events: no more episodes   Driving: Yes  Folic acid:  Prenatal vitamin  Sleep: bed 1030-11p, wake 6am, sleeps well through the night, some naps when she can  Mood: good. No SI no HI.  Activity: doing okay at work. Walks a lot.    6 months pregnant. Otherwise, no fever, no cold symptoms, no headache, no changes in vision, no new weakness, no chest pain, no shortness of breath, no nausea, no vomiting, no diarrhea, no constipation, no tingling/numbness, no problems walking.      HPI 2024:     Age of first seizure: 9yo  Handedness: left  Seizure Risk Factors:  Paternal uncle with epilepsy (staring, additional details unclear). No head strike with LOC. No CNS infections. C section for reasons that are unclear (possibly a repeat, older brother was also born via ), term, no prolonged hospitalization   Time of Last Seizure: 2024, 2021   # of lifetime Seizures:  2 events that she is aware of as an adult. As a kid, less than five total events  Frequency of Seizures: at most 2 in one day,  by years    Seizure Triggers: out of the blue  Injuries/Hospitalization for seizures? No injury, no ED  Driving? Yes   Pregnancy? 20w pregnant currently, miscarriage 2021 at 4w  Contraception? No, currently pregnant   Folic Acid?  In the prenatal  Bone Health:  No dexa   Mood: up and down, agitated, no SI, no HI  Sleep: bed 1030p (as early as 9pm), wake 6am, sleeps well overnight, tries to take naps   Exercise: [place], likes it. Walks every day.   Tobacco/etoh, etc:  no cigs, no etoh, no  drugs      Auras: out of the blue     Seizure Events:   As an adult:  Nocturnal jerking arms, legs, shoulders, out of sleep, wetting the bed, jerking 3 minutes, she will wake up during the jerking and be aware the whole time  Staring and jerking episodes as a kid, with urinary incontinence      Current AED/Neuroleptics/SEs:  None    Previous AED/SEs or reason for DC.   On topiramate between 11yo-13yo, 2021 restarted topiramate for headaches which she stopped in 2022 because her headaches were better     EEG:  Routine EEG 11/2021 normal  Routine EEG younger -> found something but not sure what ([facility] -> I was unable to find any record of this)  Ambulatory EEG 68.5 hours 11/2021 normal  MRI brain: 2022 normal     Other Allergies: none      AED compliance, adherence: no missed doses, follows a routine    ROS 5/1/2024: ligament pain in her legs, constipation. Otherwise, denies headache, loss of vision, blurred vision, diplopia, dysarthria, dysphagia, lightheadedness, vertigo, tinnitus or hearing difficulty. Denies difficulties producing or comprehending speech.  Denies focal weakness, numbness, paresthesias. Denies difficulty with gait. Denies cough, shortness of breath.  Denies chest pain or tightness, palpitations.  Denies nausea, vomiting, diarrhea, or abdominal pain.  No bowel or bladder incontinence or retention.  No falls.       EXAM:   - Vitals: There were no vitals taken for this visit.   - General: Awake, cooperative, NAD.  - HEENT: NC/AT  - Neck:  Decreased range of motion  - Pulmonary: no increased WOB  - Cardiac: well perfused   - Abdomen:  BMI  - Extremities:  Not examined  - Skin: no rashes or lesions noted.     NEURO EXAM:   - Mental Status: Awake, alert, oriented x 3. Able to relate history without difficulty. Language is fluent with intact repetition and comprehension. Normal prosody. There were no paraphasic errors. Able to name both high and low frequency objects. Speech was not dysarthric.  Able to follow both midline and appendicular commands. There was no evidence of apraxia or neglect.    - Cranial Nerves:  Pupils not evaluated. VFF to confrontation. EOMI. No facial droop. Hearing intact to room voice. Trapezii and SCM with free range of motion. Tongue protrudes in midline and to either side with no evidence of atrophy or weakness.    - Motor: No pronator drift bilaterally. No adventitious movements such as tremor or asterixis noted.  Free range of motion throughout with no obvious asymmetry.     - Sensory:  No subjective deficits  - DTRs: Unable to evaluate  - Coordination:  No obvious ataxia  - Gait:  Able to stand and ambulate around the room    PLAN:  29-year-old woman with a paternal uncle with staring episodes and myoclonic jerking during sleep who is 24 weeks pregnant.  She does not meet criteria for the diagnosis of epilepsy or for treatment with antiseizure medications at this time.  All of her events are nocturnal so she may drive.  Routine EEG normal.  Plan for an urgent 24 hour EEG vs scheduling her in the EMU if episodes reoccur. Follow up if symptoms worsen or fail to improve.     Patient Instructions   You came to Epilepsy Clinic because of myoclonic jerking during sleep. You had seizures as a child so you are at increased risk for having seizures as an adult.  You are routine EEG was normal and you have not had an additional episode of myoclonic jerking since 01/2024.  It is okay to hold off on any further procedures until/unless the event reoccurs.     If another episode occurs, please let me know over the portal.  At that time, you can elect to come to the emergency room at [facility] on [facility] and request a 24 hour EEG to be placed urgently.  Alternatively, we may decide to schedule you for an inpatient epilepsy monitoring unit (EMU) admission at that time.  During this prolonged inpatient admission, you will be monitored continuously using scalp EEGs for as long as 5-7  days.  During this admission, we may perform different maneuvers to try to induce seizures.  There are risks associated with inducing seizures, including injury and even death.  However, these risks persist in the hospital or at home with every seizure and we take many steps to protect you including a padded bed with rails, continuous video monitoring, a call button for immediate nursing assistance, continuous cardiac monitoring, and an inpatient medical team to closely follow your progress.      Per [place] law, no episodes of loss of consciousness for 6 months before driving.  Avoid dangerous situations.  For example, no baths/pools alone, no heights, no power tools.  Wear a bike helmet.  If breakthrough seizures occur that are different in character, frequency, or duration from normal episodes, please patient portal me or call the office and we will decide the next steps. If multiple seizures occur in a row without return back to baseline, 911 needs to be called.      Return to clinic with any additional episodes of shaking.  Please patient portal with any questions or concerns.     Marlene Calix MD PhD [facility]  Neurology-Epilepsy     Forms/Letters/Disability/DMV Paperwork: We understand the importance of filling out forms and providing letters in a timely manner.  However, many of these forms have very tricky language and once an official form is submitted as part of the medical record, it can not be modified or erased.  Please work with us in order to get these forms filled out in the most complete, accurate, and efficient way. 1.  Once you are aware that a form will need to be completed, please make an appointment.  A virtual appointment with Dr. Calix or Eloise is perfectly fine. 2.  Please fill out the form as much as you can.  There are many questions that we do not have an answer for.  Please bring a blank copy of the form and your partially filled out form to the clinic visit or send them to us over the  portal.  We will complete the form together with you during the clinic visit, sign it, and either return it to you or send it to the correct destination.  Every form will require an appointment however we can fill out multiple forms at once if needed.  Please do not hesitate to reach out with any questions or concerns about this policy.  We are trying to make sure that we have a system in place to meet this need which works for everyone involved.  Thank you for your understanding.       Problem List Items Addressed This Visit       Obesity (BMI 35.0-39.9 without comorbidity)    Myoclonic jerking while sleeping - Primary    24 weeks gestation of pregnancy         The patient location is:  Patient Home   The chief complaint leading to consultation is:  Myoclonic jerking in the setting of pregnancy  Visit type: Virtual visit with synchronous audio and video  Total time spent with patient:  23 minutes  Total time spent on encounter:  26 minutes   Each patient to whom Marlene Calix provides medical services by telemedicine is:  (1) informed of the relationship between the physician and patient and the respective role of any other health care provider with respect to management of the patient; and (2) notified that he or she may decline to receive medical services by telemedicine and may withdraw from such care at any time.    Disclaimer: This note has been generated using voice-recognition software. There may be typographical errors that were missed during proof-reading.     LABS:  Recent Labs   Lab 07/15/21  1512 03/17/22  1016 07/14/22  0907 07/06/23  0738   White Blood Cell Count 9.3  --   --   --    Hemoglobin 13.2  --   --   --    Hematocrit 40.8  --   --   --    Platelets 284  --   --   --    Hemoglobin A1C 5.6  --   --   --    TSH 0.674  --  0.900 1.160   RPR  --  NonReactive  --   --      IMAGING:  Recent imaging is personally reviewed with the patient.    None in the system     PAST MEDICAL HISTORY:   Active  Ambulatory Problems     Diagnosis Date Noted    Vitamin D deficiency 2020    SAB (spontaneous ) 2021    Elevated LDL cholesterol level 2020    Gastroesophageal reflux disease 2020    Mild cognitive impairment 2013    Obesity (BMI 35.0-39.9 without comorbidity) 2020    Seizure 10/19/2021    Slow transit constipation 2019    Subclinical hyperthyroidism 2020    Tachycardia 2018    Myoclonic jerking while sleeping 2024    24 weeks gestation of pregnancy 2024     Resolved Ambulatory Problems     Diagnosis Date Noted    No Resolved Ambulatory Problems     Past Medical History:   Diagnosis Date    Anxiety     Chlamydia     Epilepsy         PAST SURGICAL HISTORY:   Past Surgical History:   Procedure Laterality Date    BLADDER SURGERY      WISDOM TOOTH EXTRACTION          ALLERGIES: Patient has no known allergies.   CURRENT MEDICATIONS:   Current Outpatient Medications   Medication Sig Dispense Refill    aspirin 81 MG Chew Take 81 mg by mouth once daily.      cholecalciferol, vitamin D3, (VITAMIN D3) 25 mcg (1,000 unit) capsule Take 2,000 Units by mouth.      prenatal vit,cori 74-iron-folic 27 mg iron- 1 mg Tab        No current facility-administered medications for this visit.        FAMILY HISTORY:   Family History   Problem Relation Name Age of Onset    Diabetes Father      Hypertension Father      Diabetes Mother      Breast cancer Neg Hx      Colon cancer Neg Hx      Ovarian cancer Neg Hx           SOCIAL HISTORY:   Social History     Socioeconomic History    Marital status: Single   Tobacco Use    Smoking status: Never    Smokeless tobacco: Never   Substance and Sexual Activity    Alcohol use: No    Drug use: No    Sexual activity: Yes     Partners: Male     Birth control/protection: None     Social Determinants of Health     Financial Resource Strain: Low Risk  (2022)    [facility]    Overall Financial Resource Strain (CARDIA)     Difficulty of Paying Living Expenses: Not very hard   Food Insecurity: No Food Insecurity (8/3/2023)    [facility]    Hunger Vital Sign     Worried About Running Out of Food in the Last Year: Never true     Ran Out of Food in the Last Year: Never true   Transportation Needs: No Transportation Needs (8/3/2023)    [facility], [facility] and Its [facility] and Affiliates    PRAPARE - Transportation     Lack of Transportation (Medical): No     Lack of Transportation (Non-Medical): No   Physical Activity: Sufficiently Active (8/3/2023)    [facility], [facility] and Its [facility] and [facility]    Exercise Vital Sign     Days of Exercise per Week: 3 days     Minutes of Exercise per Session: 60 min   Stress: No Stress Concern Present (8/3/2023)    [facility], [facility]    [place] [employer] [employer] - Occupational Stress Questionnaire     Feeling of Stress : Not at all   Housing Stability: Unknown (8/3/2023)    [facility]    Housing Stability Vital Sign     Unable to Pay for  Housing in the Last Year: No     In the last 12 months, was there a time when you did not have a steady place to sleep or slept in a shelter (including now)?: No         Questions and concerns raised by the patient and family/care-giver(s) were addressed and they indicated understanding of everything discussed and agreed to plans as above.    Marlene Calix MD PhD [facility]  Neurology-Epilepsy